# Patient Record
Sex: MALE | Race: WHITE | NOT HISPANIC OR LATINO | Employment: OTHER | URBAN - METROPOLITAN AREA
[De-identification: names, ages, dates, MRNs, and addresses within clinical notes are randomized per-mention and may not be internally consistent; named-entity substitution may affect disease eponyms.]

---

## 2017-12-11 PROBLEM — L03.115 CELLULITIS OF RIGHT LOWER EXTREMITY: Status: ACTIVE | Noted: 2017-12-11

## 2017-12-11 PROBLEM — Z72.0 TOBACCO ABUSE: Status: ACTIVE | Noted: 2017-12-11

## 2017-12-11 PROBLEM — R74.01 TRANSAMINITIS: Status: ACTIVE | Noted: 2017-12-11

## 2017-12-11 PROBLEM — K92.2 GIB (GASTROINTESTINAL BLEEDING): Status: ACTIVE | Noted: 2017-12-11

## 2017-12-11 PROBLEM — D62 ACUTE BLOOD LOSS ANEMIA: Status: ACTIVE | Noted: 2017-12-11

## 2017-12-11 PROBLEM — B19.20 HEPATITIS C: Status: ACTIVE | Noted: 2017-12-11

## 2017-12-11 PROBLEM — D69.6 THROMBOCYTOPENIA: Status: ACTIVE | Noted: 2017-12-11

## 2017-12-13 PROBLEM — B35.3 TINEA PEDIS OF RIGHT FOOT: Status: ACTIVE | Noted: 2017-12-13

## 2017-12-15 PROBLEM — K92.2 GIB (GASTROINTESTINAL BLEEDING): Status: RESOLVED | Noted: 2017-12-11 | Resolved: 2017-12-15

## 2017-12-15 PROBLEM — D62 ACUTE BLOOD LOSS ANEMIA: Status: RESOLVED | Noted: 2017-12-11 | Resolved: 2017-12-15

## 2018-01-12 PROBLEM — K92.0 COFFEE GROUND EMESIS: Status: ACTIVE | Noted: 2018-01-12

## 2018-01-12 PROBLEM — K74.60 CIRRHOSIS OF LIVER WITHOUT ASCITES: Status: ACTIVE | Noted: 2018-01-12

## 2018-01-12 PROBLEM — K92.0 HEMATEMESIS WITH NAUSEA: Status: ACTIVE | Noted: 2018-01-12

## 2018-01-12 PROBLEM — E86.1 INTRAVASCULAR VOLUME DEPLETION: Status: ACTIVE | Noted: 2018-01-12

## 2018-01-13 PROBLEM — R50.9 FEVER: Status: ACTIVE | Noted: 2018-01-13

## 2018-01-14 PROBLEM — J69.0 ASPIRATION PNEUMONIA: Status: ACTIVE | Noted: 2018-01-13

## 2018-01-19 PROBLEM — I85.01 BLEEDING ESOPHAGEAL VARICES: Status: ACTIVE | Noted: 2018-01-12

## 2018-01-19 PROBLEM — K92.0 HEMATEMESIS WITH NAUSEA: Status: ACTIVE | Noted: 2018-01-19

## 2018-01-20 PROBLEM — K92.0 HEMATEMESIS WITH NAUSEA: Status: RESOLVED | Noted: 2018-01-19 | Resolved: 2018-01-20

## 2018-01-20 PROBLEM — J69.0 ASPIRATION PNEUMONIA: Status: RESOLVED | Noted: 2018-01-13 | Resolved: 2018-01-20

## 2018-01-26 PROBLEM — D64.9 ANEMIA: Status: ACTIVE | Noted: 2018-01-26

## 2018-01-26 PROBLEM — K76.82 HEPATIC ENCEPHALOPATHY: Status: ACTIVE | Noted: 2018-01-26

## 2018-01-28 PROBLEM — K76.82 HEPATIC ENCEPHALOPATHY: Status: RESOLVED | Noted: 2018-01-26 | Resolved: 2018-01-28

## 2018-02-01 PROBLEM — K76.82 HEPATIC ENCEPHALOPATHY: Status: ACTIVE | Noted: 2018-02-01

## 2018-02-01 PROBLEM — D63.8 ANEMIA, CHRONIC DISEASE: Status: ACTIVE | Noted: 2018-02-01

## 2018-02-07 PROBLEM — K92.2 UPPER GI BLEED: Status: ACTIVE | Noted: 2018-02-07

## 2018-02-16 ENCOUNTER — TELEPHONE (OUTPATIENT)
Dept: TRANSPLANT | Facility: CLINIC | Age: 58
End: 2018-02-16

## 2018-02-16 NOTE — TELEPHONE ENCOUNTER
----- Message from Wilder Daley sent at 2/16/2018  4:59 PM CST -----  Have medical recorders that where scanned into media from [facility]. Will call referring MD office if we need any additional information on the pt.    By: Wilder Daley

## 2018-02-16 NOTE — TELEPHONE ENCOUNTER
----- Message from Wilder Daley sent at 2/16/2018  4:56 PM CST -----  We have the pt recorders and they are now pending review by the referral nurse.  By:Wilder Daley

## 2018-02-22 ENCOUNTER — TELEPHONE (OUTPATIENT)
Dept: TRANSPLANT | Facility: CLINIC | Age: 58
End: 2018-02-22

## 2018-02-22 NOTE — TELEPHONE ENCOUNTER
Initial referral received via fax from Dr Alex Buck.   Patient with Hep C cirrhosis/meld  10 with multiple admissions for GI bleed and HE. Referred for liver transplant for CONSULT     Referral completed and forwarded to [facility].      Insurance: Medicaid

## 2018-02-27 ENCOUNTER — TELEPHONE (OUTPATIENT)
Dept: TRANSPLANT | Facility: CLINIC | Age: 58
End: 2018-02-27

## 2018-02-27 NOTE — TELEPHONE ENCOUNTER
----- Message from Wilder Daley sent at 2/27/2018  5:49 PM CST -----  Called pt to deann appt and sp to his sister Viky; pt booked for MARCH 15 @ 10AM/11AM w/Dr. Rodriguez. Will mail out appt slips in the mail.

## 2018-03-02 DIAGNOSIS — K74.60 CHRONIC HEPATITIS C WITH CIRRHOSIS: Primary | ICD-10-CM

## 2018-03-02 DIAGNOSIS — B18.2 CHRONIC HEPATITIS C WITH CIRRHOSIS: Primary | ICD-10-CM

## 2018-03-02 DIAGNOSIS — Z76.82 ORGAN TRANSPLANT CANDIDATE: ICD-10-CM

## 2018-03-06 PROBLEM — K74.60 CIRRHOSIS OF LIVER: Status: ACTIVE | Noted: 2018-03-06

## 2018-03-07 ENCOUNTER — LAB VISIT (OUTPATIENT)
Dept: LAB | Facility: HOSPITAL | Age: 58
End: 2018-03-07
Attending: INTERNAL MEDICINE
Payer: MEDICAID

## 2018-03-07 DIAGNOSIS — B18.2 CHRONIC HEPATITIS C WITH HEPATIC COMA: Primary | ICD-10-CM

## 2018-03-07 LAB
AMPHET+METHAMPHET UR QL: NEGATIVE
BARBITURATES UR QL SCN>200 NG/ML: NEGATIVE
BENZODIAZ UR QL SCN>200 NG/ML: NEGATIVE
BZE UR QL SCN: NEGATIVE
CANNABINOIDS UR QL SCN: NEGATIVE
CREAT UR-MCNC: 168 MG/DL
ETHANOL UR-MCNC: <10 MG/DL
METHADONE UR QL SCN>300 NG/ML: NEGATIVE
OPIATES UR QL SCN: NEGATIVE
PCP UR QL SCN>25 NG/ML: NEGATIVE
TOXICOLOGY INFORMATION: NORMAL

## 2018-03-07 PROCEDURE — 80307 DRUG TEST PRSMV CHEM ANLYZR: CPT | Mod: NTX

## 2018-03-12 ENCOUNTER — TELEPHONE (OUTPATIENT)
Dept: TRANSPLANT | Facility: CLINIC | Age: 58
End: 2018-03-12

## 2018-03-12 NOTE — TELEPHONE ENCOUNTER
----- Message from Wilder Daley sent at 3/12/2018  2:11 PM CDT -----  Rec'melinda call from pt and pt sister. Re/deann his appt for April 6 9AM/10AM w/Dr. Medina

## 2018-03-12 NOTE — TELEPHONE ENCOUNTER
----- Message from Wilder Daley sent at 3/12/2018  1:47 PM CDT -----  Returned call to pt and pt sister, but there was no answer on both numbers. LVM for either one of them to call back to re/deann the pt appt.

## 2018-03-13 DIAGNOSIS — Z76.82 ORGAN TRANSPLANT CANDIDATE: Primary | ICD-10-CM

## 2018-03-26 ENCOUNTER — TELEPHONE (OUTPATIENT)
Dept: TRANSPLANT | Facility: CLINIC | Age: 58
End: 2018-03-26

## 2018-03-26 NOTE — PROGRESS NOTES
I phoned patient and spoke with him in reference to his H&P patient was able to answer questions answered questions.

## 2018-04-06 ENCOUNTER — TELEPHONE (OUTPATIENT)
Dept: PHARMACY | Facility: CLINIC | Age: 58
End: 2018-04-06

## 2018-04-06 ENCOUNTER — OFFICE VISIT (OUTPATIENT)
Dept: TRANSPLANT | Facility: CLINIC | Age: 58
End: 2018-04-06
Payer: MEDICAID

## 2018-04-06 ENCOUNTER — LAB VISIT (OUTPATIENT)
Dept: LAB | Facility: HOSPITAL | Age: 58
End: 2018-04-06
Payer: MEDICAID

## 2018-04-06 VITALS
TEMPERATURE: 98 F | DIASTOLIC BLOOD PRESSURE: 71 MMHG | HEIGHT: 71 IN | OXYGEN SATURATION: 98 % | SYSTOLIC BLOOD PRESSURE: 122 MMHG | RESPIRATION RATE: 18 BRPM | WEIGHT: 206.13 LBS | HEART RATE: 90 BPM | BODY MASS INDEX: 28.86 KG/M2

## 2018-04-06 DIAGNOSIS — Z76.82 ORGAN TRANSPLANT CANDIDATE: ICD-10-CM

## 2018-04-06 DIAGNOSIS — D69.6 THROMBOCYTOPENIA: ICD-10-CM

## 2018-04-06 DIAGNOSIS — K76.82 HEPATIC ENCEPHALOPATHY: ICD-10-CM

## 2018-04-06 DIAGNOSIS — K74.60 CIRRHOSIS OF LIVER WITHOUT ASCITES, UNSPECIFIED HEPATIC CIRRHOSIS TYPE: ICD-10-CM

## 2018-04-06 DIAGNOSIS — D64.9 ANEMIA, UNSPECIFIED TYPE: ICD-10-CM

## 2018-04-06 DIAGNOSIS — B18.2 CHRONIC HEPATITIS C WITHOUT HEPATIC COMA: Primary | ICD-10-CM

## 2018-04-06 DIAGNOSIS — R74.01 TRANSAMINITIS: ICD-10-CM

## 2018-04-06 DIAGNOSIS — K92.2 UPPER GI BLEED: ICD-10-CM

## 2018-04-06 DIAGNOSIS — I85.10 SECONDARY ESOPHAGEAL VARICES WITHOUT BLEEDING: ICD-10-CM

## 2018-04-06 PROBLEM — L03.115 CELLULITIS OF RIGHT LOWER EXTREMITY: Status: RESOLVED | Noted: 2017-12-11 | Resolved: 2018-04-06

## 2018-04-06 LAB
A1 AG RBC QL: NORMAL
ABO + RH BLD: NORMAL
AFP SERPL-MCNC: 2.6 NG/ML
ALBUMIN SERPL BCP-MCNC: 2.9 G/DL
ALBUMIN SERPL BCP-MCNC: 2.9 G/DL
ALP SERPL-CCNC: 95 U/L
ALP SERPL-CCNC: 95 U/L
ALT SERPL W/O P-5'-P-CCNC: 35 U/L
ALT SERPL W/O P-5'-P-CCNC: 35 U/L
AMPHET+METHAMPHET UR QL: NEGATIVE
ANION GAP SERPL CALC-SCNC: 5 MMOL/L
ANION GAP SERPL CALC-SCNC: 5 MMOL/L
AST SERPL-CCNC: 53 U/L
AST SERPL-CCNC: 53 U/L
BACTERIA #/AREA URNS AUTO: ABNORMAL /HPF
BARBITURATES UR QL SCN>200 NG/ML: NEGATIVE
BASOPHILS # BLD AUTO: 0.03 K/UL
BASOPHILS NFR BLD: 0.5 %
BENZODIAZ UR QL SCN>200 NG/ML: NEGATIVE
BILIRUB DIRECT SERPL-MCNC: 0.8 MG/DL
BILIRUB SERPL-MCNC: 1.8 MG/DL
BILIRUB SERPL-MCNC: 1.8 MG/DL
BILIRUB UR QL STRIP: NEGATIVE
BLD GP AB SCN CELLS X3 SERPL QL: NORMAL
BUN SERPL-MCNC: 16 MG/DL
BUN SERPL-MCNC: 16 MG/DL
BZE UR QL SCN: NEGATIVE
CALCIUM SERPL-MCNC: 9 MG/DL
CALCIUM SERPL-MCNC: 9 MG/DL
CANNABINOIDS UR QL SCN: NEGATIVE
CAOX CRY UR QL COMP ASSIST: ABNORMAL
CHLORIDE SERPL-SCNC: 109 MMOL/L
CHLORIDE SERPL-SCNC: 109 MMOL/L
CLARITY UR REFRACT.AUTO: ABNORMAL
CO2 SERPL-SCNC: 25 MMOL/L
CO2 SERPL-SCNC: 25 MMOL/L
COLOR UR AUTO: YELLOW
CREAT SERPL-MCNC: 1.1 MG/DL
CREAT SERPL-MCNC: 1.1 MG/DL
CREAT UR-MCNC: 77 MG/DL
DIFFERENTIAL METHOD: ABNORMAL
EOSINOPHIL # BLD AUTO: 0.2 K/UL
EOSINOPHIL NFR BLD: 2.9 %
ERYTHROCYTE [DISTWIDTH] IN BLOOD BY AUTOMATED COUNT: 15.9 %
EST. GFR  (AFRICAN AMERICAN): >60 ML/MIN/1.73 M^2
EST. GFR  (AFRICAN AMERICAN): >60 ML/MIN/1.73 M^2
EST. GFR  (NON AFRICAN AMERICAN): >60 ML/MIN/1.73 M^2
EST. GFR  (NON AFRICAN AMERICAN): >60 ML/MIN/1.73 M^2
ETHANOL UR-MCNC: <10 MG/DL
GGT SERPL-CCNC: 23 U/L
GLUCOSE SERPL-MCNC: 100 MG/DL
GLUCOSE SERPL-MCNC: 100 MG/DL
GLUCOSE UR QL STRIP: NEGATIVE
HCT VFR BLD AUTO: 28.9 %
HGB BLD-MCNC: 8.9 G/DL
HGB UR QL STRIP: NEGATIVE
HYALINE CASTS UR QL AUTO: 11 /LPF
IMM GRANULOCYTES # BLD AUTO: 0.02 K/UL
IMM GRANULOCYTES NFR BLD AUTO: 0.4 %
INR PPP: 1.2
KETONES UR QL STRIP: NEGATIVE
LEUKOCYTE ESTERASE UR QL STRIP: ABNORMAL
LYMPHOCYTES # BLD AUTO: 1 K/UL
LYMPHOCYTES NFR BLD: 18.5 %
MCH RBC QN AUTO: 24.2 PG
MCHC RBC AUTO-ENTMCNC: 30.8 G/DL
MCV RBC AUTO: 79 FL
METHADONE UR QL SCN>300 NG/ML: NEGATIVE
MICROSCOPIC COMMENT: ABNORMAL
MONOCYTES # BLD AUTO: 1.1 K/UL
MONOCYTES NFR BLD: 18.9 %
NEUTROPHILS # BLD AUTO: 3.3 K/UL
NEUTROPHILS NFR BLD: 58.8 %
NITRITE UR QL STRIP: NEGATIVE
NRBC BLD-RTO: 0 /100 WBC
OPIATES UR QL SCN: NEGATIVE
PCP UR QL SCN>25 NG/ML: NEGATIVE
PH UR STRIP: 5 [PH] (ref 5–8)
PLATELET # BLD AUTO: 86 K/UL
PMV BLD AUTO: 10.5 FL
POTASSIUM SERPL-SCNC: 4.1 MMOL/L
POTASSIUM SERPL-SCNC: 4.1 MMOL/L
PROT SERPL-MCNC: 7 G/DL
PROT SERPL-MCNC: 7 G/DL
PROT UR QL STRIP: NEGATIVE
PROTHROMBIN TIME: 12.2 SEC
RBC # BLD AUTO: 3.68 M/UL
RBC #/AREA URNS AUTO: 3 /HPF (ref 0–4)
SODIUM SERPL-SCNC: 139 MMOL/L
SODIUM SERPL-SCNC: 139 MMOL/L
SP GR UR STRIP: 1.01 (ref 1–1.03)
TOXICOLOGY INFORMATION: NORMAL
URN SPEC COLLECT METH UR: ABNORMAL
UROBILINOGEN UR STRIP-ACNC: NEGATIVE EU/DL
WBC # BLD AUTO: 5.57 K/UL
WBC #/AREA URNS AUTO: 21 /HPF (ref 0–5)

## 2018-04-06 PROCEDURE — 80053 COMPREHEN METABOLIC PANEL: CPT

## 2018-04-06 PROCEDURE — 82977 ASSAY OF GGT: CPT

## 2018-04-06 PROCEDURE — 85025 COMPLETE CBC W/AUTO DIFF WBC: CPT

## 2018-04-06 PROCEDURE — 36415 COLL VENOUS BLD VENIPUNCTURE: CPT

## 2018-04-06 PROCEDURE — 99205 OFFICE O/P NEW HI 60 MIN: CPT | Mod: S$PBB,TXP,, | Performed by: INTERNAL MEDICINE

## 2018-04-06 PROCEDURE — 86905 BLOOD TYPING RBC ANTIGENS: CPT | Mod: TXP

## 2018-04-06 PROCEDURE — 80321 ALCOHOLS BIOMARKERS 1OR 2: CPT | Mod: TXP

## 2018-04-06 PROCEDURE — 82105 ALPHA-FETOPROTEIN SERUM: CPT

## 2018-04-06 PROCEDURE — 99214 OFFICE O/P EST MOD 30 MIN: CPT | Mod: PBBFAC,25 | Performed by: INTERNAL MEDICINE

## 2018-04-06 PROCEDURE — 99999 PR PBB SHADOW E&M-EST. PATIENT-LVL IV: CPT | Mod: PBBFAC,TXP,, | Performed by: INTERNAL MEDICINE

## 2018-04-06 PROCEDURE — 81001 URINALYSIS AUTO W/SCOPE: CPT | Mod: 59

## 2018-04-06 PROCEDURE — 80307 DRUG TEST PRSMV CHEM ANLYZR: CPT | Mod: NTX

## 2018-04-06 PROCEDURE — 86850 RBC ANTIBODY SCREEN: CPT

## 2018-04-06 PROCEDURE — 82248 BILIRUBIN DIRECT: CPT

## 2018-04-06 PROCEDURE — 85610 PROTHROMBIN TIME: CPT | Mod: NTX

## 2018-04-06 RX ORDER — FUROSEMIDE 40 MG/1
40 TABLET ORAL DAILY
Refills: 5 | COMMUNITY
Start: 2018-03-28

## 2018-04-06 RX ORDER — SPIRONOLACTONE 100 MG/1
TABLET, FILM COATED ORAL
Refills: 5 | COMMUNITY
Start: 2018-03-28 | End: 2018-10-18

## 2018-04-06 NOTE — LETTER
April 7, 2018        Alex Buck  [address]  [facility], [facility] [address]  Phone: [phone]  Fax: [phone]             [facility]  [address]  [facility] [address]  Phone: [phone]   Patient: Ray Balderas   MR Number: [id number]   YOB: 1960   Date of Visit: 4/6/2018       Dear Dr. Alex Buck    Thank you for referring Ray Balderas to me for evaluation. Attached you will find relevant portions of my assessment and plan of care.    If you have questions, please do not hesitate to call me. I look forward to following Ray Balderas along with you.    Sincerely,    Liane Medina MD    Enclosure    If you would like to receive this communication electronically, please contact [email or web address] or [phone] to request [employer] Link access.    [employer] Link is a tool which provides read-only access to select patient information with whom you have a relationship. Its easy to use and provides real time access to review your patients record including encounter summaries, notes, results, and demographic information.    If you feel you have received this communication in error or would no longer like to receive these types of communications, please e-mail [email or web address]

## 2018-04-06 NOTE — PATIENT INSTRUCTIONS
1. Hold on transplant evaluation  2. Add rifaximin to the lactulose; aim to have 4 BMs per day  3. Do monthly blood tests for me  4. See Dr Buck re repeat EGD now  Return 3 months

## 2018-04-06 NOTE — PROGRESS NOTES
Transplant Hepatology  Liver Transplant Recipient Evaluation      Original Referring Provider: Alex Buck MD  Current Corresponding Physician: Alex Buck MD    UNOS Native Liver Diagnosis: Cirrhosis: Type C    Reason for Visit: evaluation for liver transplant     Subjective:     Ray Balderas is a 57 y.o. male with ESLD secondary to chronic hepatitis C. He has had life-threatening esophageal variceal bleeding with multiple recent admissions secondary to complications from his liver cirrhosis. Most recently his admissions include:     December 11-December 15,2017: cellulitus    January 12 - January 20 2018: Life-threatening upper GI bleeding. CODE BLUE during hospital stay with massive bleeding. Underwent transfusion, TIPS procedure. 1/19/18.     January 26 - January 28 2018: Admission for hepatic encephalopathy     February 1 - February 2 2018: Admission for hepatic encephalopathy. Found to be intermittently compliant with his lactulose.     February 7th-February 10 2018: hematemasis: tx 2 U PRBC; TIPS patent on US doppler; EV seen on EGD-banded; rebanded 3/6/18. Initially on propranolol- but stopped due to extreme fatigue.    MELD-Na score: 12 at 4/6/2018  8:31 AM  MELD score: 12 at 4/6/2018  8:31 AM  Calculated from:  Serum Creatinine: 1.1 mg/dL at 4/6/2018  8:31 AM  Serum Sodium: 139 mmol/L (Rounded to 137) at 4/6/2018  8:31 AM  Total Bilirubin: 1.8 mg/dL at 4/6/2018  8:31 AM  INR(ratio): 1.2 at 4/6/2018  8:31 AM  Age: 57 years     He has chronic HCV and started treatment for HCV 2 days ago. He is on MAVYRET. HE s/p TIPS not well controlled on lactulose monotherapy.    Review of Systems   Constitutional: Negative.    HENT: Negative.    Eyes: Negative.    Respiratory: Negative.    Cardiovascular: Negative.    Gastrointestinal: Negative.    Genitourinary: Negative.    Musculoskeletal: Negative.    Skin: Negative.    Neurological: Negative.    Psychiatric/Behavioral: Negative.        Objective:    Physical Exam   Constitutional: He is oriented to person, place, and time. He appears well-developed and well-nourished.   HENT:   Head: Normocephalic and atraumatic.   Eyes: Conjunctivae and EOM are normal. Pupils are equal, round, and reactive to light. No scleral icterus.   Neck: Normal range of motion. Neck supple. No thyromegaly present.   Cardiovascular: Normal rate, regular rhythm and normal heart sounds.    Pulmonary/Chest: Effort normal and breath sounds normal. He has no rales.   Abdominal: Soft. Bowel sounds are normal. He exhibits no distension and no mass. There is no tenderness.   Musculoskeletal: Normal range of motion. He exhibits no edema.   Neurological: He is alert and oriented to person, place, and time.   Skin: Skin is warm and dry. No rash noted.   Psychiatric: He has a normal mood and affect.   Vitals reviewed.    MELD-Na score: 12 at 4/6/2018  8:31 AM  MELD score: 12 at 4/6/2018  8:31 AM  Calculated from:  Serum Creatinine: 1.1 mg/dL at 4/6/2018  8:31 AM  Serum Sodium: 139 mmol/L (Rounded to 137) at 4/6/2018  8:31 AM  Total Bilirubin: 1.8 mg/dL at 4/6/2018  8:31 AM  INR(ratio): 1.2 at 4/6/2018  8:31 AM  Age: 57 years  Lab Results   Component Value Date     04/06/2018     04/06/2018    BUN 16 04/06/2018    BUN 16 04/06/2018    CREATININE 1.1 04/06/2018    CREATININE 1.1 04/06/2018    CALCIUM 9.0 04/06/2018    CALCIUM 9.0 04/06/2018     04/06/2018     04/06/2018    K 4.1 04/06/2018    K 4.1 04/06/2018     04/06/2018     04/06/2018    PROT 7.0 04/06/2018    PROT 7.0 04/06/2018    CO2 25 04/06/2018    CO2 25 04/06/2018    WBC 5.57 04/06/2018    RBC 3.68 (L) 04/06/2018    HGB 8.9 (L) 04/06/2018    HCT 28.9 (L) 04/06/2018    PLT 86 (L) 04/06/2018     Lab Results   Component Value Date    ALBUMIN 2.9 (L) 04/06/2018    ALBUMIN 2.9 (L) 04/06/2018    BILITOT 1.8 (H) 04/06/2018    BILITOT 1.8 (H) 04/06/2018    AST 53 (H) 04/06/2018    AST 53 (H) 04/06/2018    ALT  35 04/06/2018    ALT 35 04/06/2018    ALKPHOS 95 04/06/2018    ALKPHOS 95 04/06/2018    MG 2.3 02/08/2018    LABPROT 12.2 04/06/2018    INR 1.2 04/06/2018             Transplant Hepatology - Candidacy   Assessment/Plan:     Transplant Candidacy: Ray Balderas is a 57 y.o. male with ESLD secondary to hepatitis C here for evaluation for possible OLT.  He has recurrent variceal bleeding despite a TIPS procedure which appears to be patent and coiling of a varix (US suggests TIPS patency). MELD score is only 12. He is undergoing sequential EGDs with Dr Buck who feels varices are under good control. He has started HCV treatment which may result in improvement in liver function. I am deferring a liver tranpslant evalutaion for now. Current recommendations:  1. Decompensated cirrhosis, MELD <15: monitor meld labs monthly. Defer liver tx evaluation since MELD is <15, EV are being treated by endoscopy and pt has started HCV treatment  2. HCV, on treatment- continue  3. Esophageal varices, s/p TIPS: continue aggressive endoscopic management; repeat EGD now  4. HE, not well controlled: add rifaximin to lactulose treatment.  Return 3 months  Liane Medina MD         [facility] Patient Status  Functional Status: 90% - Able to carry on normal activity: minor symptoms of disease  Physical Capacity: Limited Mobility

## 2018-04-09 ENCOUNTER — TELEPHONE (OUTPATIENT)
Dept: PHARMACY | Facility: CLINIC | Age: 58
End: 2018-04-09

## 2018-04-09 ENCOUNTER — TELEPHONE (OUTPATIENT)
Dept: HEPATOLOGY | Facility: CLINIC | Age: 58
End: 2018-04-09

## 2018-04-09 NOTE — TELEPHONE ENCOUNTER
----- Message from Liane Medina MD sent at 4/8/2018  3:58 PM CDT -----  No other causes for elevated liver tests so far - please let patient know.

## 2018-04-09 NOTE — TELEPHONE ENCOUNTER
Patient informed that labs are normal no cause for elevated liver enzymes so far. Patient states understanding.

## 2018-04-11 NOTE — PROGRESS NOTES
MELD 12.  Appt card completed for pt to repeat labs monthly and to RTC in three months.  If patient stable and MELD <15 at that time, will schedule f/u in Hepatology clinic.

## 2018-04-18 LAB — PHOSPHATIDYLETHANOL (PETH): NEGATIVE NG/ML

## 2018-04-24 PROBLEM — I85.00 ESOPHAGEAL VARICES: Status: ACTIVE | Noted: 2018-04-24

## 2018-05-10 PROBLEM — L03.115 CELLULITIS OF RIGHT LOWER EXTREMITY: Status: ACTIVE | Noted: 2018-05-10

## 2018-05-10 PROBLEM — A41.9 SEVERE SEPSIS: Status: ACTIVE | Noted: 2018-05-10

## 2018-05-10 PROBLEM — G93.40 ACUTE ENCEPHALOPATHY: Status: ACTIVE | Noted: 2018-05-10

## 2018-05-10 PROBLEM — R65.20 SEVERE SEPSIS: Status: ACTIVE | Noted: 2018-05-10

## 2018-05-10 PROBLEM — L03.115 CELLULITIS OF RIGHT LEG: Status: ACTIVE | Noted: 2018-05-10

## 2018-05-11 PROBLEM — E03.9 HYPOTHYROID: Status: ACTIVE | Noted: 2018-05-11

## 2018-05-12 PROBLEM — Z51.5 PALLIATIVE CARE BY SPECIALIST: Status: ACTIVE | Noted: 2018-05-12

## 2018-06-14 ENCOUNTER — LAB VISIT (OUTPATIENT)
Dept: LAB | Facility: HOSPITAL | Age: 58
End: 2018-06-14
Attending: INTERNAL MEDICINE
Payer: MEDICAID

## 2018-06-14 DIAGNOSIS — K74.60 CIRRHOSIS OF LIVER WITHOUT ASCITES, UNSPECIFIED HEPATIC CIRRHOSIS TYPE: ICD-10-CM

## 2018-06-14 LAB
ALBUMIN SERPL BCP-MCNC: 3 G/DL
ALP SERPL-CCNC: 85 U/L
ALT SERPL W/O P-5'-P-CCNC: 20 U/L
ANION GAP SERPL CALC-SCNC: 7 MMOL/L
AST SERPL-CCNC: 31 U/L
BASOPHILS # BLD AUTO: 0.03 K/UL
BASOPHILS NFR BLD: 0.7 %
BILIRUB SERPL-MCNC: 1.2 MG/DL
BUN SERPL-MCNC: 15 MG/DL
CALCIUM SERPL-MCNC: 9.5 MG/DL
CHLORIDE SERPL-SCNC: 107 MMOL/L
CO2 SERPL-SCNC: 25 MMOL/L
CREAT SERPL-MCNC: 1 MG/DL
DIFFERENTIAL METHOD: ABNORMAL
EOSINOPHIL # BLD AUTO: 0.2 K/UL
EOSINOPHIL NFR BLD: 4.2 %
ERYTHROCYTE [DISTWIDTH] IN BLOOD BY AUTOMATED COUNT: 21 %
EST. GFR  (AFRICAN AMERICAN): >60 ML/MIN/1.73 M^2
EST. GFR  (NON AFRICAN AMERICAN): >60 ML/MIN/1.73 M^2
GLUCOSE SERPL-MCNC: 98 MG/DL
HCT VFR BLD AUTO: 32.5 %
HGB BLD-MCNC: 10.5 G/DL
IMM GRANULOCYTES # BLD AUTO: 0.01 K/UL
IMM GRANULOCYTES NFR BLD AUTO: 0.2 %
INR PPP: 1.1
LYMPHOCYTES # BLD AUTO: 1.2 K/UL
LYMPHOCYTES NFR BLD: 30.4 %
MCH RBC QN AUTO: 25.4 PG
MCHC RBC AUTO-ENTMCNC: 32.3 G/DL
MCV RBC AUTO: 79 FL
MONOCYTES # BLD AUTO: 0.5 K/UL
MONOCYTES NFR BLD: 12 %
NEUTROPHILS # BLD AUTO: 2.1 K/UL
NEUTROPHILS NFR BLD: 52.5 %
NRBC BLD-RTO: 0 /100 WBC
PLATELET # BLD AUTO: 59 K/UL
PMV BLD AUTO: ABNORMAL FL
POTASSIUM SERPL-SCNC: 5 MMOL/L
PROT SERPL-MCNC: 7.1 G/DL
PROTHROMBIN TIME: 11.7 SEC
RBC # BLD AUTO: 4.13 M/UL
SODIUM SERPL-SCNC: 139 MMOL/L
WBC # BLD AUTO: 4.01 K/UL

## 2018-06-14 PROCEDURE — 36415 COLL VENOUS BLD VENIPUNCTURE: CPT | Mod: PO,NTX

## 2018-06-14 PROCEDURE — 85025 COMPLETE CBC W/AUTO DIFF WBC: CPT | Mod: TXP

## 2018-06-14 PROCEDURE — 80053 COMPREHEN METABOLIC PANEL: CPT | Mod: TXP

## 2018-06-14 PROCEDURE — 85610 PROTHROMBIN TIME: CPT | Mod: PO,TXP

## 2018-06-28 ENCOUNTER — TELEPHONE (OUTPATIENT)
Dept: TRANSPLANT | Facility: CLINIC | Age: 58
End: 2018-06-28

## 2018-06-28 NOTE — TELEPHONE ENCOUNTER
----- Message from Wilder Daley sent at 6/28/2018 10:30 AM CDT -----  Called and sp to pt sister about f/u appt with Dr. Medina. Pt [facility] for 7/25. Will mail out appt info

## 2018-07-03 PROBLEM — K74.60 HEPATIC CIRRHOSIS: Status: ACTIVE | Noted: 2018-07-03

## 2018-07-16 ENCOUNTER — TELEPHONE (OUTPATIENT)
Dept: TRANSPLANT | Facility: CLINIC | Age: 58
End: 2018-07-16

## 2018-07-16 DIAGNOSIS — K76.82 HEPATIC ENCEPHALOPATHY: ICD-10-CM

## 2018-07-16 NOTE — TELEPHONE ENCOUNTER
Pt has refills available for Rifaximin. Called Pt to discuss his concerns regarding taking the rifaximin. Left message on V/M with contact information to return call.        ----- Message from Giulia Case sent at 7/16/2018  1:04 PM CDT -----  Contact: pt  Rx Refill/Request     Is this a Refill or New Rx:  refill  Rx Name and Strength:  rifAXIMin (XIFAXAN) 550 mg Tab  Preferred Pharmacy with phone number: mail order  Communication Preference: [phone]  Additional Information: pt want to know if he still has to take this medication or not. Believes it was just momentarily.

## 2018-07-16 NOTE — TELEPHONE ENCOUNTER
Returned Pt's call refill request sent to Dr Flynn        ----- Message from Leisa Morton RN sent at 7/16/2018  4:05 PM CDT -----  Contact: patient      ----- Message -----  From: Sonia Hart  Sent: 7/16/2018   3:55 PM  To: [facility]-Liver Transplant Clinical    Patient calling for rifAXIMin (XIFAXAN) 550 mg Tab medication.         Please call: [phone]      Thanks

## 2018-07-25 ENCOUNTER — OFFICE VISIT (OUTPATIENT)
Dept: TRANSPLANT | Facility: CLINIC | Age: 58
End: 2018-07-25
Attending: INTERNAL MEDICINE
Payer: MEDICAID

## 2018-07-25 ENCOUNTER — HOSPITAL ENCOUNTER (OUTPATIENT)
Dept: RADIOLOGY | Facility: OTHER | Age: 58
Discharge: HOME | End: 2018-07-25
Attending: INTERNAL MEDICINE
Payer: MEDICAID

## 2018-07-25 VITALS
WEIGHT: 214.31 LBS | HEIGHT: 72 IN | SYSTOLIC BLOOD PRESSURE: 116 MMHG | OXYGEN SATURATION: 97 % | BODY MASS INDEX: 29.03 KG/M2 | RESPIRATION RATE: 18 BRPM | TEMPERATURE: 98 F | DIASTOLIC BLOOD PRESSURE: 71 MMHG | HEART RATE: 77 BPM

## 2018-07-25 DIAGNOSIS — Z76.82 ORGAN TRANSPLANT CANDIDATE: ICD-10-CM

## 2018-07-25 DIAGNOSIS — B18.2 CHRONIC HEPATITIS C WITHOUT HEPATIC COMA: ICD-10-CM

## 2018-07-25 DIAGNOSIS — K76.82 HEPATIC ENCEPHALOPATHY: Primary | ICD-10-CM

## 2018-07-25 DIAGNOSIS — K74.60 CIRRHOSIS OF LIVER WITHOUT ASCITES, UNSPECIFIED HEPATIC CIRRHOSIS TYPE: ICD-10-CM

## 2018-07-25 DIAGNOSIS — I85.10 SECONDARY ESOPHAGEAL VARICES WITHOUT BLEEDING: ICD-10-CM

## 2018-07-25 PROBLEM — L03.115 CELLULITIS OF RIGHT LEG: Status: RESOLVED | Noted: 2018-05-10 | Resolved: 2018-07-25

## 2018-07-25 PROBLEM — Z51.5 PALLIATIVE CARE BY SPECIALIST: Status: RESOLVED | Noted: 2018-05-12 | Resolved: 2018-07-25

## 2018-07-25 PROBLEM — R65.20 SEVERE SEPSIS: Status: RESOLVED | Noted: 2018-05-10 | Resolved: 2018-07-25

## 2018-07-25 PROBLEM — A41.9 SEVERE SEPSIS: Status: RESOLVED | Noted: 2018-05-10 | Resolved: 2018-07-25

## 2018-07-25 PROBLEM — G93.40 ACUTE ENCEPHALOPATHY: Status: RESOLVED | Noted: 2018-05-10 | Resolved: 2018-07-25

## 2018-07-25 PROBLEM — L03.115 CELLULITIS OF RIGHT LOWER EXTREMITY: Status: RESOLVED | Noted: 2017-12-11 | Resolved: 2018-07-25

## 2018-07-25 PROCEDURE — 93975 VASCULAR STUDY: CPT | Mod: TC,NTX

## 2018-07-25 PROCEDURE — 93975 VASCULAR STUDY: CPT | Mod: 26,TXP,, | Performed by: RADIOLOGY

## 2018-07-25 PROCEDURE — 99215 OFFICE O/P EST HI 40 MIN: CPT | Mod: NTX,S$GLB,, | Performed by: INTERNAL MEDICINE

## 2018-07-25 PROCEDURE — 76700 US EXAM ABDOM COMPLETE: CPT | Mod: 26,59,TXP, | Performed by: RADIOLOGY

## 2018-07-25 RX ORDER — PANTOPRAZOLE SODIUM 40 MG/1
40 TABLET, DELAYED RELEASE ORAL DAILY
COMMUNITY
End: 2018-07-25 | Stop reason: SDUPTHER

## 2018-07-25 RX ORDER — PANTOPRAZOLE SODIUM 40 MG/1
40 TABLET, DELAYED RELEASE ORAL DAILY
Qty: 30 TABLET | Refills: 11 | Status: SHIPPED | OUTPATIENT
Start: 2018-07-25

## 2018-07-25 NOTE — LETTER
July 25, 2018        Alex Buck  [address]  [facility], [facility] [address]  Phone: [phone]  Fax: [phone]             [facility] Liver Transplant  [address] [place] [address]  Phone: [phone]  Fax: [phone]   Patient: Ray Balderas   MR Number: [id number]   YOB: 1960   Date of Visit: 7/25/2018       Dear Dr. Alex Buck    Thank you for referring Ray Balderas to me for evaluation. Attached you will find relevant portions of my assessment and plan of care.    If you have questions, please do not hesitate to call me. I look forward to following Ray Balderas along with you.    Sincerely,    Liane Medina MD    Enclosure    If you would like to receive this communication electronically, please contact [email or web address] or [phone] to request [employer] access.    [employer] Link is a tool which provides read-only access to select patient information with whom you have a relationship. Its easy to use and provides real time access to review your patients record including encounter summaries, notes, results, and demographic information.    If you feel you have received this communication in error or would no longer like to receive these types of communications, please e-mail [email or web address]

## 2018-07-25 NOTE — Clinical Note
His MELD has improved to 9. He has not had any further variceal bleeds. Last EGD- July 2018- small varices and repeat recommended in one year. He has completed HCV therapy. He is medically early for liver transplant. Will recommend closing his transplant episode.

## 2018-07-25 NOTE — PROGRESS NOTES
"   Transplant Hepatology  Liver Transplant Evaluation foloow Up      Original Referring Provider: Alex Buck MD  Current Corresponding Physician: Alex Buck MD    UNOS Native Liver Diagnosis: Cirrhosis: Type C    Reason for Visit: follow up decompensated cirrhosis; Does patient need to undergo evaluation for liver transplant     Subjective:     Ray Balderas is a 57 y.o. male with ESLD secondary to alcohol and chronic hepatitis C. He has had life-threatening esophageal variceal bleeding with multiple recent admissions secondary to complications from his liver cirrhosis. Most recently his admissions include:     December 11-December 15,2017: cellulitus    January 12 - January 20 2018: Life-threatening upper GI bleeding. CODE BLUE during hospital stay with massive bleeding. Underwent transfusion, TIPS procedure. 1/19/18.     January 26 - January 28 2018: Admission for hepatic encephalopathy     February 1 - February 2 2018: Admission for hepatic encephalopathy. Found to be intermittently compliant with his lactulose.     February 7th-February 10 2018: hematemasis: tx 2 U PRBC; TIPS patent on US doppler; EV seen on EGD-banded; rebanded 3/6/18. Initially on propranolol- but stopped due to extreme fatigue.    May 9th-13th, 2018: admission for HE: noted to have cellulitus. Notes from discharge summary: "Admitted to active drinking." Patient denies this. He states last alcohol was Philip 10, 2018. He is smoking marijuana to help with back pain.    Last EGD 7/3/18: no banding; repeat recommended in 1 year.    MELD-Na score: 9 at 7/25/2018  9:33 AM  MELD score: 9 at 7/25/2018  9:33 AM  Calculated from:  Serum Creatinine: 1 mg/dL at 7/25/2018  9:33 AM  Serum Sodium: 140 mmol/L (Rounded to 137) at 7/25/2018  9:33 AM  Total Bilirubin: 1.5 mg/dL at 7/25/2018  9:33 AM  INR(ratio): 1.1 at 7/25/2018  9:33 AM  Age: 57 years     He has chronic HCV and started treatment for HCV  April 5th, 2018 days ago. He completed 12 " weeks of MAVYRET (would have completed early July). HE s/p TIPS better controlled on lactulose with rifaximin.    Review of Systems   Constitutional: Negative.    HENT: Negative.    Eyes: Negative.    Respiratory: Negative.    Cardiovascular: Negative.    Gastrointestinal: Negative.    Genitourinary: Negative.    Musculoskeletal: Negative.    Skin: Negative.    Neurological: Negative.    Psychiatric/Behavioral: Negative.        Objective:   Physical Exam   Constitutional: He is oriented to person, place, and time. He appears well-developed and well-nourished.   HENT:   Head: Normocephalic and atraumatic.   Eyes: Conjunctivae and EOM are normal. Pupils are equal, round, and reactive to light. No scleral icterus.   Neck: Normal range of motion. Neck supple. No thyromegaly present.   Cardiovascular: Normal rate, regular rhythm and normal heart sounds.    Pulmonary/Chest: Effort normal and breath sounds normal. He has no rales.   Abdominal: Soft. Bowel sounds are normal. He exhibits no distension and no mass. There is no tenderness.   Musculoskeletal: Normal range of motion. He exhibits no edema.   Neurological: He is alert and oriented to person, place, and time.   Skin: Skin is warm and dry. No rash noted.   Psychiatric: He has a normal mood and affect.   Vitals reviewed.    MELD-Na score: 9 at 7/25/2018  9:33 AM  MELD score: 9 at 7/25/2018  9:33 AM  Calculated from:  Serum Creatinine: 1 mg/dL at 7/25/2018  9:33 AM  Serum Sodium: 140 mmol/L (Rounded to 137) at 7/25/2018  9:33 AM  Total Bilirubin: 1.5 mg/dL at 7/25/2018  9:33 AM  INR(ratio): 1.1 at 7/25/2018  9:33 AM  Age: 57 years  Lab Results   Component Value Date    GLU 92 07/25/2018    BUN 17 07/25/2018    CREATININE 1.0 07/25/2018    CALCIUM 9.4 07/25/2018     07/25/2018    K 4.5 07/25/2018     07/25/2018    PROT 7.5 07/25/2018    CO2 27 07/25/2018    WBC 3.66 (L) 07/25/2018    RBC 4.32 (L) 07/25/2018    HGB 11.5 (L) 07/25/2018    HCT 34.8 (L)  07/25/2018    PLT 85 (L) 07/25/2018     Lab Results   Component Value Date    CHOL 63 (L) 05/12/2018    TRIG 86 05/12/2018    HDL 19 (L) 05/12/2018    CHOLHDL 30.2 05/12/2018    TOTALCHOLEST 3.3 05/12/2018    ALBUMIN 3.4 (L) 07/25/2018    BILITOT 1.5 (H) 07/25/2018    AST 37 07/25/2018    ALT 22 07/25/2018    ALKPHOS 91 07/25/2018    MG 1.8 05/12/2018    LABPROT 12.0 07/25/2018    INR 1.1 07/25/2018             Transplant Hepatology - Candidacy   Assessment/Plan:     Transplant Candidacy: Ray Balderas is a 57 y.o. male with ESLD secondary to hepatitis C. His MELD has improved to 9. He has not had any further variceal bleeds. Last EGD- July 2018- small varices and repeat recommended in one year. He has completed HCV therapy. He is medically early for liver transplant. Will recommend closing his transplant episode. Other recommendations:    1. Decompensated cirrhosis, MELD <15: monitor meld labs every 3 months. Screen for HCC today and every 6 months with imaging and AFP.    2. HCV, completed 12 weeks of Mavyret early July 2018- check HCV viral load today and in 3 months  3. Esophageal varices, s/p TIPS: repeat EGD in one year; check TIPS patency today  4. HE, well controlled: continue rifaximin and lactulose.    Return 3 months  Liane Medina MD         [facility] Patient Status  Functional Status: 90% - Able to carry on normal activity: minor symptoms of disease  Physical Capacity: Limited Mobility.

## 2018-07-25 NOTE — PATIENT INSTRUCTIONS
1. labs every 3 months  2. US today as scheduled  3. EGD in 1 year  4. Continue protonix  5. Labs today  6. HCV viral load in 3 months  Return 3 months

## 2018-07-27 ENCOUNTER — TELEPHONE (OUTPATIENT)
Dept: HEPATOLOGY | Facility: CLINIC | Age: 58
End: 2018-07-27

## 2018-07-27 NOTE — TELEPHONE ENCOUNTER
----- Message from Lesley Razo MD sent at 7/27/2018  4:47 PM CDT -----  Please inform patient that there is no evidence of hepatitis C.  This is good news.  We will check again in 3 months to confirm cure.

## 2018-10-03 ENCOUNTER — TELEPHONE (OUTPATIENT)
Dept: HEPATOLOGY | Facility: CLINIC | Age: 58
End: 2018-10-03

## 2018-10-03 NOTE — TELEPHONE ENCOUNTER
----- Message from Mylene Hansen sent at 10/2/2018  5:17 PM CDT -----  Contact: Patient      ----- Message -----  From: Liberty Guillen  Sent: 10/2/2018   3:39 PM  To: Carol Rob Staff, Hepatology Scheduling    Patient Returning Call from [facility]    Who Left Message for Patient: Unkn    Communication Preference: [phone]    Additional Information: Pt request appt to be on a Tuesday morning at 9.  Pt stated if call goes to voicemail it is okay to leave a message

## 2018-10-18 ENCOUNTER — OFFICE VISIT (OUTPATIENT)
Dept: HEPATOLOGY | Facility: CLINIC | Age: 58
End: 2018-10-18
Payer: MEDICAID

## 2018-10-18 VITALS
DIASTOLIC BLOOD PRESSURE: 80 MMHG | HEIGHT: 71 IN | BODY MASS INDEX: 29.26 KG/M2 | RESPIRATION RATE: 17 BRPM | TEMPERATURE: 98 F | HEART RATE: 65 BPM | WEIGHT: 209 LBS | OXYGEN SATURATION: 98 % | SYSTOLIC BLOOD PRESSURE: 120 MMHG

## 2018-10-18 DIAGNOSIS — R74.01 TRANSAMINITIS: ICD-10-CM

## 2018-10-18 DIAGNOSIS — K76.82 HEPATIC ENCEPHALOPATHY: ICD-10-CM

## 2018-10-18 DIAGNOSIS — I85.10 SECONDARY ESOPHAGEAL VARICES WITHOUT BLEEDING: ICD-10-CM

## 2018-10-18 DIAGNOSIS — B18.2 CHRONIC HEPATITIS C WITHOUT HEPATIC COMA: ICD-10-CM

## 2018-10-18 DIAGNOSIS — R21 RASH: ICD-10-CM

## 2018-10-18 DIAGNOSIS — K74.60 CIRRHOSIS OF LIVER WITHOUT ASCITES, UNSPECIFIED HEPATIC CIRRHOSIS TYPE: Primary | ICD-10-CM

## 2018-10-18 PROCEDURE — 99215 OFFICE O/P EST HI 40 MIN: CPT | Mod: S$PBB,,, | Performed by: INTERNAL MEDICINE

## 2018-10-18 PROCEDURE — 99999 PR PBB SHADOW E&M-EST. PATIENT-LVL IV: CPT | Mod: PBBFAC,,, | Performed by: INTERNAL MEDICINE

## 2018-10-18 PROCEDURE — 99214 OFFICE O/P EST MOD 30 MIN: CPT | Mod: PBBFAC,PN | Performed by: INTERNAL MEDICINE

## 2018-10-18 RX ORDER — TRIAMTERENE CAPSULES 100 MG/1
100 CAPSULE ORAL DAILY
Qty: 30 CAPSULE | Refills: 11 | Status: SHIPPED | OUTPATIENT
Start: 2018-10-18 | End: 2018-10-22 | Stop reason: SDUPTHER

## 2018-10-18 NOTE — PATIENT INSTRUCTIONS
1. Continue labs every 3 months  2. US every 6 months- next end of July 2019  3. See pcp isaac ugalde  4. Check hepatitis C now and in 3 months x 2  5. Continue the lactulsoe and rifaximin  6. Spironolactone- stop and start dyrenium  Return feb 2019

## 2018-10-18 NOTE — PROGRESS NOTES
"   Transplant Hepatology Follow Up    Original Referring Provider: Alex Buck MD  Current Corresponding Physician: Alex Buck MD    [facility] Liver Diagnosis: Cirrhosis: Type C    Reason for Visit: follow up decompensated cirrhosis;      Subjective:     Ray Balderas is a 58 y.o. male with ESLD secondary to alcohol and chronic hepatitis C. He has had life-threatening esophageal variceal bleeding with multiple recent admissions secondary to complications from his liver cirrhosis. He has not been in the hospital since I saw him in July 2018. Most recent admissions:     December 11-December 15,2017: cellulitus    January 12 - January 20 2018: Life-threatening upper GI bleeding. CODE BLUE during hospital stay with massive bleeding. Underwent transfusion, TIPS procedure 1/19/18 January 26 - January 28 2018: Admission for hepatic encephalopathy     February 1 - February 2 2018: Admission for hepatic encephalopathy. Found to be intermittently compliant with his lactulose.     February 7th-February 10 2018: hematemasis: tx 2 U PRBC; TIPS patent on US doppler; EV seen on EGD-banded; rebanded 3/6/18. Initially on propranolol- but stopped due to extreme fatigue.    May 9th-13th, 2018: admission for HE: noted to have cellulitus. Notes from discharge summary: "Admitted to active drinking." Patient denies this. He states last alcohol was Philip 10, 2018. He is smoking marijuana to help with back pain.    Last EGD 7/3/18: no banding; repeat recommended in 1 year.    MELD-Na score: 9 at 7/25/2018  9:33 AM  MELD score: 9 at 7/25/2018  9:33 AM  Calculated from:  Serum Creatinine: 1 mg/dL at 7/25/2018  9:33 AM  Serum Sodium: 140 mmol/L (Rounded to 137 mmol/L) at 7/25/2018  9:33 AM  Total Bilirubin: 1.5 mg/dL at 7/25/2018  9:33 AM  INR(ratio): 1.1 at 7/25/2018  9:33 AM  Age: 57 years     He has chronic HCV and completed treatment for HCV (12 weeks of MAVYRET) (completed early July). HE s/p TIPS better controlled on " lactulose with rifaximin. He remains on modest doses of diuretics. He is c/o new breast tenderness on spironolactone. Most recent ultrasound was done 07/18 and no liver cancer was seen and TIPS was patent.    He is concerned re a rash on his penis and also that he may have fleas from his cat.    Review of Systems   Constitutional: Negative.    HENT: Negative.    Eyes: Negative.    Respiratory: Negative.    Cardiovascular: Negative.    Gastrointestinal: Negative.    Genitourinary: Negative.    Musculoskeletal: Negative.    Skin: Negative.    Neurological: Negative.    Psychiatric/Behavioral: Negative.        Objective:   Physical Exam   Constitutional: He is oriented to person, place, and time. He appears well-developed and well-nourished.   HENT:   Head: Normocephalic and atraumatic.   Eyes: Conjunctivae and EOM are normal. Pupils are equal, round, and reactive to light. No scleral icterus.   Neck: Normal range of motion. Neck supple. No thyromegaly present.   Cardiovascular: Normal rate, regular rhythm and normal heart sounds.   Pulmonary/Chest: Effort normal and breath sounds normal. He has no rales.   Abdominal: Soft. Bowel sounds are normal. He exhibits no distension and no mass. There is no tenderness.   Musculoskeletal: Normal range of motion. He exhibits no edema.   Neurological: He is alert and oriented to person, place, and time.   Skin: Skin is warm and dry. No rash noted.   Psychiatric: He has a normal mood and affect.   Vitals reviewed.    MELD-Na score: 9 at 7/25/2018  9:33 AM  MELD score: 9 at 7/25/2018  9:33 AM  Calculated from:  Serum Creatinine: 1 mg/dL at 7/25/2018  9:33 AM  Serum Sodium: 140 mmol/L (Rounded to 137 mmol/L) at 7/25/2018  9:33 AM  Total Bilirubin: 1.5 mg/dL at 7/25/2018  9:33 AM  INR(ratio): 1.1 at 7/25/2018  9:33 AM  Age: 57 years     Lab Results   Component Value Date    GLU 92 07/25/2018    BUN 17 07/25/2018    CREATININE 1.0 07/25/2018    CALCIUM 9.4 07/25/2018      07/25/2018    K 4.5 07/25/2018     07/25/2018    PROT 7.5 07/25/2018    CO2 27 07/25/2018    WBC 3.66 (L) 07/25/2018    RBC 4.32 (L) 07/25/2018    HGB 11.5 (L) 07/25/2018    HCT 34.8 (L) 07/25/2018    PLT 85 (L) 07/25/2018     Lab Results   Component Value Date    CHOL 63 (L) 05/12/2018    TRIG 86 05/12/2018    HDL 19 (L) 05/12/2018    CHOLHDL 30.2 05/12/2018    TOTALCHOLEST 3.3 05/12/2018    ALBUMIN 3.4 (L) 07/25/2018    BILITOT 1.5 (H) 07/25/2018    AST 37 07/25/2018    ALT 22 07/25/2018    ALKPHOS 91 07/25/2018    MG 1.8 05/12/2018    LABPROT 12.0 07/25/2018    INR 1.1 07/25/2018       Assessment/Plan:      Ray Balderas is a 58 y.o. male with ESLD secondary to hepatitis C. His MELD has improved, 9 in July. He has not had any further variceal bleeds. Last EGD- July 2018. He has completed HCV therapy and viral load was negative at the end of treatment. He is medically early for liver transplant. Other recommendations:    1. Decompensated cirrhosis, MELD <15: monitor meld labs every 3 months. Screen for HCC every 6 months with imaging and AFP (next due 01/19).    2. HCV, completed 12 weeks of Mavyret early July 2018- check HCV viral load today and in 3 months to document SVR.  3. Esophageal varices, s/p TIPS: no EGD unless TIPS becomes blocked. Check TIPS patency with next US.  4. HE, well controlled: continue rifaximin and lactulose.  5. Alcohol use- continues to abstain  6. Ascites, ongoing: continue diuretics; switch spironolactone to dyrenium, same dose.  6. Rash: referral to dermatology.    Return 02/19    MD MILTON Hargrove Patient Status  Functional Status: 90% - Able to carry on normal activity: minor symptoms of disease  Physical Capacity: Limited Mobility. .

## 2018-10-22 ENCOUNTER — TELEPHONE (OUTPATIENT)
Dept: HEPATOLOGY | Facility: CLINIC | Age: 58
End: 2018-10-22

## 2018-10-22 RX ORDER — TRIAMTERENE CAPSULES 100 MG/1
100 CAPSULE ORAL DAILY
Qty: 30 CAPSULE | Refills: 11 | Status: SHIPPED | OUTPATIENT
Start: 2018-10-22 | End: 2023-10-11 | Stop reason: CLARIF

## 2018-11-05 ENCOUNTER — TELEPHONE (OUTPATIENT)
Dept: HEPATOLOGY | Facility: CLINIC | Age: 58
End: 2018-11-05

## 2020-07-05 ENCOUNTER — OFFICE VISIT (OUTPATIENT)
Dept: URGENT CARE | Facility: CLINIC | Age: 60
End: 2020-07-05
Payer: MEDICAID

## 2020-07-05 VITALS — TEMPERATURE: 99 F

## 2020-07-05 DIAGNOSIS — Z01.818 PRE-OP TESTING: Primary | ICD-10-CM

## 2020-07-05 PROCEDURE — 99201 PR OFFICE/OUTPT VISIT,NEW,LEVL I: ICD-10-PCS | Mod: S$GLB,,, | Performed by: PHYSICIAN ASSISTANT

## 2020-07-05 PROCEDURE — U0003 INFECTIOUS AGENT DETECTION BY NUCLEIC ACID (DNA OR RNA); SEVERE ACUTE RESPIRATORY SYNDROME CORONAVIRUS 2 (SARS-COV-2) (CORONAVIRUS DISEASE [COVID-19]), AMPLIFIED PROBE TECHNIQUE, MAKING USE OF HIGH THROUGHPUT TECHNOLOGIES AS DESCRIBED BY CMS-2020-01-R: HCPCS

## 2020-07-05 PROCEDURE — 99201 PR OFFICE/OUTPT VISIT,NEW,LEVL I: CPT | Mod: S$GLB,,, | Performed by: PHYSICIAN ASSISTANT

## 2020-07-05 NOTE — PATIENT INSTRUCTIONS

## 2020-07-05 NOTE — PROGRESS NOTES
Pt comes in for a COVID19 test for pre-op. Denies symptoms.     Counseled the patient.  Explained with test results mean.  Discussed self-quarantine until procedure.  Answered all questions.

## 2020-07-06 ENCOUNTER — TELEPHONE (OUTPATIENT)
Dept: URGENT CARE | Facility: CLINIC | Age: 60
End: 2020-07-06

## 2020-07-06 LAB — SARS-COV-2 RNA RESP QL NAA+PROBE: NEGATIVE

## 2022-04-21 ENCOUNTER — LAB VISIT (OUTPATIENT)
Dept: LAB | Facility: HOSPITAL | Age: 62
End: 2022-04-21
Attending: INTERNAL MEDICINE
Payer: MEDICAID

## 2022-04-21 DIAGNOSIS — K70.30 ALCOHOLIC CIRRHOSIS OF LIVER: Primary | ICD-10-CM

## 2022-04-21 LAB
ALBUMIN SERPL BCP-MCNC: 2.9 G/DL (ref 3.5–5.2)
ALP SERPL-CCNC: 99 U/L (ref 55–135)
ALT SERPL W/O P-5'-P-CCNC: 26 U/L (ref 10–44)
ANION GAP SERPL CALC-SCNC: 5 MMOL/L (ref 8–16)
AST SERPL-CCNC: 35 U/L (ref 10–40)
BASOPHILS # BLD AUTO: 0.04 K/UL (ref 0–0.2)
BASOPHILS NFR BLD: 0.9 % (ref 0–1.9)
BILIRUB SERPL-MCNC: 1.4 MG/DL (ref 0.1–1)
BUN SERPL-MCNC: 11 MG/DL (ref 8–23)
CALCIUM SERPL-MCNC: 9.2 MG/DL (ref 8.7–10.5)
CHLORIDE SERPL-SCNC: 106 MMOL/L (ref 95–110)
CO2 SERPL-SCNC: 30 MMOL/L (ref 23–29)
CREAT SERPL-MCNC: 0.8 MG/DL (ref 0.5–1.4)
DIFFERENTIAL METHOD: ABNORMAL
EOSINOPHIL # BLD AUTO: 0.3 K/UL (ref 0–0.5)
EOSINOPHIL NFR BLD: 6.4 % (ref 0–8)
ERYTHROCYTE [DISTWIDTH] IN BLOOD BY AUTOMATED COUNT: 13.7 % (ref 11.5–14.5)
EST. GFR  (AFRICAN AMERICAN): >60 ML/MIN/1.73 M^2
EST. GFR  (NON AFRICAN AMERICAN): >60 ML/MIN/1.73 M^2
GLUCOSE SERPL-MCNC: 73 MG/DL (ref 70–110)
HCT VFR BLD AUTO: 39.5 % (ref 40–54)
HGB BLD-MCNC: 14 G/DL (ref 14–18)
IMM GRANULOCYTES # BLD AUTO: 0.01 K/UL (ref 0–0.04)
IMM GRANULOCYTES NFR BLD AUTO: 0.2 % (ref 0–0.5)
INR PPP: 1.2 (ref 0.8–1.2)
LYMPHOCYTES # BLD AUTO: 1.3 K/UL (ref 1–4.8)
LYMPHOCYTES NFR BLD: 29 % (ref 18–48)
MCH RBC QN AUTO: 33.8 PG (ref 27–31)
MCHC RBC AUTO-ENTMCNC: 35.4 G/DL (ref 32–36)
MCV RBC AUTO: 95 FL (ref 82–98)
MONOCYTES # BLD AUTO: 0.5 K/UL (ref 0.3–1)
MONOCYTES NFR BLD: 11.3 % (ref 4–15)
NEUTROPHILS # BLD AUTO: 2.4 K/UL (ref 1.8–7.7)
NEUTROPHILS NFR BLD: 52.2 % (ref 38–73)
NRBC BLD-RTO: 0 /100 WBC
PLATELET # BLD AUTO: 80 K/UL (ref 150–450)
PMV BLD AUTO: 12.2 FL (ref 9.2–12.9)
POTASSIUM SERPL-SCNC: 4.2 MMOL/L (ref 3.5–5.1)
PROT SERPL-MCNC: 6.3 G/DL (ref 6–8.4)
PROTHROMBIN TIME: 12.6 SEC (ref 9–12.5)
RBC # BLD AUTO: 4.14 M/UL (ref 4.6–6.2)
SODIUM SERPL-SCNC: 141 MMOL/L (ref 136–145)
WBC # BLD AUTO: 4.52 K/UL (ref 3.9–12.7)

## 2022-04-21 PROCEDURE — 85610 PROTHROMBIN TIME: CPT | Mod: PO | Performed by: INTERNAL MEDICINE

## 2022-04-21 PROCEDURE — 82105 ALPHA-FETOPROTEIN SERUM: CPT | Performed by: INTERNAL MEDICINE

## 2022-04-21 PROCEDURE — 85025 COMPLETE CBC W/AUTO DIFF WBC: CPT | Performed by: INTERNAL MEDICINE

## 2022-04-21 PROCEDURE — 36415 COLL VENOUS BLD VENIPUNCTURE: CPT | Mod: PO | Performed by: INTERNAL MEDICINE

## 2022-04-21 PROCEDURE — 80053 COMPREHEN METABOLIC PANEL: CPT | Performed by: INTERNAL MEDICINE

## 2022-04-22 LAB — AFP SERPL-MCNC: 4.1 NG/ML (ref 0–8.4)

## 2023-10-11 PROBLEM — E87.6 HYPOKALEMIA: Status: ACTIVE | Noted: 2023-10-11

## 2023-10-11 PROBLEM — C22.9 LIVER CANCER: Status: RESOLVED | Noted: 2023-10-11 | Resolved: 2023-10-11

## 2023-10-11 PROBLEM — R18.8 CIRRHOSIS OF LIVER WITH ASCITES: Status: ACTIVE | Noted: 2018-01-12

## 2023-10-11 PROBLEM — R79.89 INCREASED AMMONIA LEVEL: Status: RESOLVED | Noted: 2023-10-11 | Resolved: 2023-10-11

## 2023-10-11 PROBLEM — R79.89 INCREASED AMMONIA LEVEL: Status: ACTIVE | Noted: 2023-10-11

## 2023-10-11 PROBLEM — E87.1 HYPONATREMIA: Status: RESOLVED | Noted: 2023-10-11 | Resolved: 2023-10-11

## 2023-10-11 PROBLEM — E83.42 HYPOMAGNESEMIA: Status: ACTIVE | Noted: 2023-10-11

## 2023-10-11 PROBLEM — R60.0 BILATERAL LOWER EXTREMITY EDEMA: Status: ACTIVE | Noted: 2023-10-11

## 2023-10-11 PROBLEM — K74.60 HEPATIC CIRRHOSIS: Status: RESOLVED | Noted: 2018-07-03 | Resolved: 2023-10-11

## 2023-10-11 PROBLEM — C22.9 LIVER CANCER: Status: ACTIVE | Noted: 2023-10-11

## 2023-10-11 PROBLEM — R93.2 ABNORMAL CT SCAN, GALLBLADDER: Status: ACTIVE | Noted: 2023-10-11

## 2023-10-11 PROBLEM — R93.2 ABNORMAL CT SCAN, GALLBLADDER: Status: RESOLVED | Noted: 2023-10-11 | Resolved: 2023-10-11

## 2023-10-11 PROBLEM — E80.6 HYPERBILIRUBINEMIA: Status: ACTIVE | Noted: 2023-10-11

## 2023-10-11 PROBLEM — R60.0 BILATERAL LOWER EXTREMITY EDEMA: Status: RESOLVED | Noted: 2023-10-11 | Resolved: 2023-10-11

## 2023-10-11 PROBLEM — R94.31 PROLONGED Q-T INTERVAL ON ECG: Status: ACTIVE | Noted: 2023-10-11

## 2023-10-11 PROBLEM — R06.02 SHORTNESS OF BREATH: Status: ACTIVE | Noted: 2023-10-11

## 2023-10-11 PROBLEM — R16.0 LIVER MASS: Status: ACTIVE | Noted: 2023-10-11

## 2023-10-11 PROBLEM — R94.31 PROLONGED Q-T INTERVAL ON ECG: Status: RESOLVED | Noted: 2023-10-11 | Resolved: 2023-10-11

## 2023-10-11 PROBLEM — R06.02 SHORTNESS OF BREATH: Status: RESOLVED | Noted: 2023-10-11 | Resolved: 2023-10-11

## 2023-10-11 PROBLEM — R65.10 SIRS (SYSTEMIC INFLAMMATORY RESPONSE SYNDROME): Status: ACTIVE | Noted: 2023-10-11

## 2023-10-11 PROBLEM — E88.09 HYPOALBUMINEMIA: Status: RESOLVED | Noted: 2023-10-11 | Resolved: 2023-10-11

## 2023-10-11 PROBLEM — E88.09 HYPOALBUMINEMIA: Status: ACTIVE | Noted: 2023-10-11

## 2023-10-11 PROBLEM — R17 JAUNDICE: Status: ACTIVE | Noted: 2023-10-11

## 2023-10-11 PROBLEM — R17 JAUNDICE: Status: RESOLVED | Noted: 2023-10-11 | Resolved: 2023-10-11

## 2023-10-11 PROBLEM — E87.1 HYPONATREMIA: Status: ACTIVE | Noted: 2023-10-11

## 2023-10-11 PROBLEM — K72.90 DECOMPENSATED HEPATIC CIRRHOSIS: Status: ACTIVE | Noted: 2018-01-12

## 2023-10-15 PROBLEM — C22.0 HEPATOCELLULAR CARCINOMA: Status: ACTIVE | Noted: 2023-10-15
